# Patient Record
Sex: MALE | Race: ASIAN | NOT HISPANIC OR LATINO | Employment: FULL TIME | URBAN - METROPOLITAN AREA
[De-identification: names, ages, dates, MRNs, and addresses within clinical notes are randomized per-mention and may not be internally consistent; named-entity substitution may affect disease eponyms.]

---

## 2018-01-15 ENCOUNTER — CONVERSION ENCOUNTER (OUTPATIENT)
Dept: OTHER | Facility: HOSPITAL | Age: 48
End: 2018-01-15

## 2019-03-19 ENCOUNTER — OFFICE VISIT CONVERTED (OUTPATIENT)
Dept: FAMILY MEDICINE CLINIC | Facility: CLINIC | Age: 49
End: 2019-03-19
Attending: NURSE PRACTITIONER

## 2021-05-15 VITALS
SYSTOLIC BLOOD PRESSURE: 126 MMHG | WEIGHT: 190 LBS | HEART RATE: 92 BPM | TEMPERATURE: 99.4 F | DIASTOLIC BLOOD PRESSURE: 86 MMHG | OXYGEN SATURATION: 95 % | HEIGHT: 66 IN | BODY MASS INDEX: 30.53 KG/M2

## 2021-05-16 VITALS
DIASTOLIC BLOOD PRESSURE: 88 MMHG | HEART RATE: 100 BPM | RESPIRATION RATE: 16 BRPM | TEMPERATURE: 98 F | BODY MASS INDEX: 30.53 KG/M2 | SYSTOLIC BLOOD PRESSURE: 135 MMHG | WEIGHT: 190 LBS | HEIGHT: 66 IN | OXYGEN SATURATION: 96 %

## 2022-03-18 PROCEDURE — U0004 COV-19 TEST NON-CDC HGH THRU: HCPCS | Performed by: FAMILY MEDICINE

## 2023-04-16 NOTE — PROGRESS NOTES
"Chief Complaint:  Colonoscopy    Primary Care Provider: Henri Bland MD    Referring Provider: Henri Bland MD    History of Present Illness  Marty Yarbrough is a 52 y.o. male referred by Henri Bland MD to have a colonoscopy.  The patient has never had a colonoscopy.  No change in normal bowel function and no family history of colon cancer.    Allergies: Patient has no known allergies.    Outpatient Medications Marked as Taking for the 4/17/23 encounter (Office Visit) with Juni Bolivar MD   Medication Sig Dispense Refill   • aspirin 81 MG chewable tablet Chew 1 tablet Daily.     • atenolol (TENORMIN) 25 MG tablet Take 1 tablet by mouth Daily.     • atorvastatin (LIPITOR) 20 MG tablet Take 1 tablet by mouth Daily.     • azithromycin (Zithromax Z-Bahman) 250 MG tablet Take 2 tab po today then take 1 tab po qd x 4 days 6 tablet 0   • losartan (COZAAR) 50 MG tablet Take 1 tablet by mouth Daily.         Past Medical History:   • Diabetes mellitus   • Diverticulitis of colon   • Hyperlipidemia   • Hypertension        No past surgical history on file.    Family History:   Family History   Problem Relation Age of Onset   • Diabetes Mother    • Hypertension Mother         Social History:  Social History     Tobacco Use   • Smoking status: Never   • Smokeless tobacco: Never   Substance Use Topics   • Alcohol use: Yes     Comment: Maybe 1-2 beers a month       Objective     Vital Signs:  Resp 16   Ht 167.6 cm (66\")   Wt 86.5 kg (190 lb 9.6 oz)   BMI 30.76 kg/m²   • Constitutional:  healthy appearing, alert, no acute distress, reliable historian  • HENT:  NCAT, no visible deformities or lesions  • Eyes:  sclerae clear, conjunctivae clear, EOMI  • Neck:  normal appearance, no masses, trachea midline  • Respiratory:  breathing not labored, respiratory effort appears normal  • Cardiovascular:  heart regular rate  • Abdomen:  nondistended    • Skin and subcutaneous tissue:  no visible concerning rashes or lesions, no " jaundice  • Musculoskeletal: moving all extremities symmetrically and purposefully  • Neurologic:  no obvious motor or sensory deficits, normal gait, able to stand without difficulty, cerebellar function without any obvious abnormalities, alert & oriented x 3, speech clear  • Psychiatric:  judgment and insight intact, mood normal, affect appropriate, cooperative      Assessment:  Encounter for screening colonoscopy    Plan:  Colonoscopy    Discussion: Indications, options, risks, benefits, and expected outcomes of planned surgery were discussed with the patient and he agrees to proceed.    Juni Bolivar MD  04/17/2023    Electronically signed by Juni Bolivar MD, 04/17/23, 2:55 PM EDT.

## 2023-04-17 ENCOUNTER — PREP FOR SURGERY (OUTPATIENT)
Dept: OTHER | Facility: HOSPITAL | Age: 53
End: 2023-04-17
Payer: OTHER GOVERNMENT

## 2023-04-17 ENCOUNTER — OFFICE VISIT (OUTPATIENT)
Dept: SURGERY | Facility: CLINIC | Age: 53
End: 2023-04-17
Payer: OTHER GOVERNMENT

## 2023-04-17 VITALS — HEIGHT: 66 IN | BODY MASS INDEX: 30.63 KG/M2 | WEIGHT: 190.6 LBS | RESPIRATION RATE: 16 BRPM

## 2023-04-17 DIAGNOSIS — Z12.11 ENCOUNTER FOR SCREENING COLONOSCOPY: Primary | ICD-10-CM

## 2023-04-17 PROCEDURE — S0260 H&P FOR SURGERY: HCPCS | Performed by: SURGERY

## 2023-04-28 ENCOUNTER — TELEPHONE (OUTPATIENT)
Dept: SURGERY | Facility: CLINIC | Age: 53
End: 2023-04-28
Payer: OTHER GOVERNMENT

## 2023-04-28 NOTE — TELEPHONE ENCOUNTER
Caller: GROVER ROSE    Relationship: [facility]    Best call back number: [phone]    What form or medical record are you requesting: PROG NOTE: 4/17    Who is requesting this form or medical record from you: [facility]    How would you like to receive the form or medical records (pick-up, mail, fax): FAX  If fax, what is the fax number: [phone]    Timeframe paperwork needed: AS SOON AS POSSIBLE    Additional notes: N/A

## 2023-08-07 ENCOUNTER — TELEPHONE (OUTPATIENT)
Dept: SURGERY | Facility: CLINIC | Age: 53
End: 2023-08-07
Payer: OTHER GOVERNMENT

## 2023-11-17 ENCOUNTER — TELEPHONE (OUTPATIENT)
Dept: UROLOGY | Facility: CLINIC | Age: 53
End: 2023-11-17
Payer: OTHER GOVERNMENT

## 2023-12-15 NOTE — PRE-PROCEDURE INSTRUCTIONS
PAT call attempted.  No answer.  Left detailed message with:  date, arrival time of 1000,   location, need for , children under 12 must remain in waiting room, diet/NPO, meds, bring list of meds to hospital, and call back number for questions.

## 2023-12-26 ENCOUNTER — ANESTHESIA EVENT (OUTPATIENT)
Dept: GASTROENTEROLOGY | Facility: HOSPITAL | Age: 53
End: 2023-12-26
Payer: OTHER GOVERNMENT

## 2023-12-26 NOTE — ANESTHESIA PREPROCEDURE EVALUATION
Anesthesia Evaluation     NPO Solid Status: > 8 hours  NPO Liquid Status: > 2 hours           Airway   Mallampati: I  TM distance: >3 FB  Neck ROM: full  No difficulty expected  Dental - normal exam     Pulmonary    Cardiovascular     (+) hypertension 2 medications or greater, hyperlipidemia      Neuro/Psych  GI/Hepatic/Renal/Endo      Musculoskeletal     Abdominal    Substance History      OB/GYN          Other            Phys Exam Other: Has a scab on the lower right lip                Anesthesia Plan    ASA 2     general   total IV anesthesia  intravenous induction     Anesthetic plan, risks, benefits, and alternatives have been provided, discussed and informed consent has been obtained with: patient.  Pre-procedure education provided  Plan discussed with CRNA.      CODE STATUS:

## 2023-12-27 ENCOUNTER — ANESTHESIA (OUTPATIENT)
Dept: GASTROENTEROLOGY | Facility: HOSPITAL | Age: 53
End: 2023-12-27
Payer: OTHER GOVERNMENT

## 2023-12-27 ENCOUNTER — HOSPITAL ENCOUNTER (OUTPATIENT)
Facility: HOSPITAL | Age: 53
Setting detail: HOSPITAL OUTPATIENT SURGERY
Discharge: HOME | End: 2023-12-27
Attending: SURGERY | Admitting: SURGERY
Payer: OTHER GOVERNMENT

## 2023-12-27 VITALS
DIASTOLIC BLOOD PRESSURE: 82 MMHG | OXYGEN SATURATION: 95 % | HEART RATE: 91 BPM | BODY MASS INDEX: 29.68 KG/M2 | WEIGHT: 183.86 LBS | RESPIRATION RATE: 14 BRPM | TEMPERATURE: 97.7 F | SYSTOLIC BLOOD PRESSURE: 119 MMHG

## 2023-12-27 PROBLEM — Z12.11 ENCOUNTER FOR SCREENING COLONOSCOPY: Status: RESOLVED | Noted: 2023-04-17 | Resolved: 2023-12-27

## 2023-12-27 PROCEDURE — 25010000002 PROPOFOL 10 MG/ML EMULSION: Performed by: NURSE ANESTHETIST, CERTIFIED REGISTERED

## 2023-12-27 PROCEDURE — 25810000003 LACTATED RINGERS PER 1000 ML: Performed by: NURSE ANESTHETIST, CERTIFIED REGISTERED

## 2023-12-27 RX ORDER — LIDOCAINE HYDROCHLORIDE 20 MG/ML
INJECTION, SOLUTION EPIDURAL; INFILTRATION; INTRACAUDAL; PERINEURAL AS NEEDED
Status: DISCONTINUED | OUTPATIENT
Start: 2023-12-27 | End: 2023-12-27 | Stop reason: SURG

## 2023-12-27 RX ORDER — PROPOFOL 10 MG/ML
VIAL (ML) INTRAVENOUS AS NEEDED
Status: DISCONTINUED | OUTPATIENT
Start: 2023-12-27 | End: 2023-12-27 | Stop reason: SURG

## 2023-12-27 RX ORDER — SODIUM CHLORIDE, SODIUM LACTATE, POTASSIUM CHLORIDE, CALCIUM CHLORIDE 600; 310; 30; 20 MG/100ML; MG/100ML; MG/100ML; MG/100ML
30 INJECTION, SOLUTION INTRAVENOUS CONTINUOUS
Status: DISCONTINUED | OUTPATIENT
Start: 2023-12-27 | End: 2023-12-27 | Stop reason: HOSPADM

## 2023-12-27 RX ADMIN — PROPOFOL 50 MG: 10 INJECTION, EMULSION INTRAVENOUS at 11:00

## 2023-12-27 RX ADMIN — PROPOFOL 250 MCG/KG/MIN: 10 INJECTION, EMULSION INTRAVENOUS at 11:00

## 2023-12-27 RX ADMIN — PROPOFOL 100 MG: 10 INJECTION, EMULSION INTRAVENOUS at 10:58

## 2023-12-27 RX ADMIN — SODIUM CHLORIDE, POTASSIUM CHLORIDE, SODIUM LACTATE AND CALCIUM CHLORIDE: 600; 310; 30; 20 INJECTION, SOLUTION INTRAVENOUS at 10:57

## 2023-12-27 RX ADMIN — LIDOCAINE HYDROCHLORIDE 100 MG: 20 INJECTION, SOLUTION EPIDURAL; INFILTRATION; INTRACAUDAL; PERINEURAL at 10:58

## 2023-12-27 NOTE — H&P
Chief Complaint:  Colonoscopy    Primary Care Provider: Henri Bland MD    Referring Provider: Henri Bland MD    History of Present Illness  Marty Yarbrough is a 52 y.o. male referred by Henri Bland MD to have a colonoscopy.  The patient has never had a colonoscopy.  No change in normal bowel function and no family history of colon cancer.    Allergies: Patient has no known allergies.    Outpatient Medications Marked as Taking for the 4/17/23 encounter (Office Visit) with Juni Bolivar MD   Medication Sig Dispense Refill    aspirin 81 MG chewable tablet Chew 1 tablet Daily.      atenolol (TENORMIN) 25 MG tablet Take 1 tablet by mouth Daily.      atorvastatin (LIPITOR) 20 MG tablet Take 1 tablet by mouth Daily.      azithromycin (Zithromax Z-Bahman) 250 MG tablet Take 2 tab po today then take 1 tab po qd x 4 days 6 tablet 0    losartan (COZAAR) 50 MG tablet Take 1 tablet by mouth Daily.         Past Medical History:    Diabetes mellitus    Diverticulitis of colon    Hyperlipidemia    Hypertension        No past surgical history on file.    Family History:   Family History   Problem Relation Age of Onset    Diabetes Mother     Hypertension Mother         Social History:  Social History     Tobacco Use    Smoking status: Never    Smokeless tobacco: Never   Substance Use Topics    Alcohol use: Yes     Comment: Maybe 1-2 beers a month     Physical Exam  Vitals - Available in the EMR.  Reviewed.  Respiratory:  breathing not labored, respiratory effort appears normal  Cardiovascular:  heart regular rate  Skin and subcutaneous tissue:  warm and dry  Musculoskeletal: moving all extremities symmetrically and purposefully  Neurologic:  no obvious motor or sensory deficits, speech clear  Psychiatric:  judgment and insight intact, mood normal      Assessment   Screening colonoscopy    Plan  Colonoscopy    Risks and benefits discussed    Juni Bolivar M.D.  12/27/23    Electronically signed by Juni Bolivar MD,  12/27/23, 7:14 AM EST.

## 2023-12-27 NOTE — ANESTHESIA POSTPROCEDURE EVALUATION
Patient: Marty Yarbrough    Procedure Summary       Date: 12/27/23 Room / Location: [facility] ENDOSCOPY 1 / [facility] ENDOSCOPY    Anesthesia Start: 1057 Anesthesia Stop: 1114    Procedure: COLONOSCOPY Diagnosis:       Encounter for screening colonoscopy      (Encounter for screening colonoscopy [Z12.11])    Surgeons: Juni Bolivar MD Provider: Luana Ayoub CRNA    Anesthesia Type: general ASA Status: 2            Anesthesia Type: general    Vitals  Vitals Value Taken Time   /82 12/27/23 1128   Temp 36.5 °C (97.7 °F) 12/27/23 1128   Pulse 88 12/27/23 1129   Resp 14 12/27/23 1128   SpO2 95 % 12/27/23 1129   Vitals shown include unfiled device data.        Post Anesthesia Care and Evaluation    Post-procedure mental status: acceptable.  Pain management: satisfactory to patient    Airway patency: patent  Anesthetic complications: No anesthetic complications    Cardiovascular status: acceptable  Respiratory status: acceptable    Comments: Per chart review

## (undated) DEVICE — SOLIDIFIER LIQLOC PLS 1500CC BT

## (undated) DEVICE — Device

## (undated) DEVICE — GLV SURG BIOGEL LTX PF 7 1/2

## (undated) DEVICE — CONN JET HYDRA H20 AUXILIARY DISP

## (undated) DEVICE — SOL IRRG H2O PL/BG 1000ML STRL

## (undated) DEVICE — SOL IRR NACL 0.9PCT BT 1000ML

## (undated) DEVICE — LINER SURG CANSTR SXN S/RIGD 1500CC

## (undated) DEVICE — Device: Brand: DEFENDO AIR/WATER/SUCTION AND BIOPSY VALVE

## (undated) DEVICE — GLV SURG SENSICARE SLT PF LF 7.5 STRL